# Patient Record
Sex: FEMALE | Race: WHITE | NOT HISPANIC OR LATINO | Employment: UNEMPLOYED | URBAN - METROPOLITAN AREA
[De-identification: names, ages, dates, MRNs, and addresses within clinical notes are randomized per-mention and may not be internally consistent; named-entity substitution may affect disease eponyms.]

---

## 2018-09-02 ENCOUNTER — OFFICE VISIT (OUTPATIENT)
Dept: URGENT CARE | Facility: CLINIC | Age: 53
End: 2018-09-02
Payer: COMMERCIAL

## 2018-09-02 VITALS
OXYGEN SATURATION: 96 % | BODY MASS INDEX: 24.92 KG/M2 | WEIGHT: 132 LBS | DIASTOLIC BLOOD PRESSURE: 67 MMHG | RESPIRATION RATE: 20 BRPM | HEART RATE: 98 BPM | HEIGHT: 61 IN | TEMPERATURE: 100.5 F | SYSTOLIC BLOOD PRESSURE: 124 MMHG

## 2018-09-02 DIAGNOSIS — J06.9 VIRAL UPPER RESPIRATORY TRACT INFECTION: Primary | ICD-10-CM

## 2018-09-02 PROCEDURE — 99203 OFFICE O/P NEW LOW 30 MIN: CPT | Performed by: EMERGENCY MEDICINE

## 2018-09-02 RX ORDER — MELATONIN
1000 DAILY
COMMUNITY

## 2018-09-02 RX ORDER — MORPHINE SULFATE 15 MG/1
15 TABLET ORAL 3 TIMES DAILY
COMMUNITY

## 2018-09-02 RX ORDER — MULTIVIT WITH MINERALS/LUTEIN
1000 TABLET ORAL DAILY
COMMUNITY

## 2018-09-02 RX ORDER — ATORVASTATIN CALCIUM 20 MG/1
20 TABLET, FILM COATED ORAL DAILY
COMMUNITY

## 2018-09-02 RX ORDER — GABAPENTIN 300 MG/1
300 CAPSULE ORAL 2 TIMES DAILY
COMMUNITY

## 2018-09-02 RX ORDER — MULTIVITAMIN
1 CAPSULE ORAL DAILY
COMMUNITY

## 2018-09-02 RX ORDER — HYDROCODONE BITARTRATE AND ACETAMINOPHEN 5; 325 MG/1; MG/1
1 TABLET ORAL EVERY 6 HOURS PRN
COMMUNITY

## 2018-09-02 RX ORDER — ASPIRIN 81 MG/1
81 TABLET ORAL DAILY
COMMUNITY

## 2018-09-02 RX ORDER — PHENOL 1.4 %
600 AEROSOL, SPRAY (ML) MUCOUS MEMBRANE 2 TIMES DAILY WITH MEALS
COMMUNITY

## 2018-09-02 RX ORDER — SERTRALINE HYDROCHLORIDE 100 MG/1
50 TABLET, FILM COATED ORAL DAILY
COMMUNITY

## 2018-09-02 RX ORDER — PREDNISONE 10 MG/1
TABLET ORAL
Qty: 27 TABLET | Refills: 0 | Status: SHIPPED | OUTPATIENT
Start: 2018-09-02

## 2018-09-02 RX ORDER — AMLODIPINE BESYLATE 10 MG/1
10 TABLET ORAL DAILY
COMMUNITY

## 2018-09-02 NOTE — PROGRESS NOTES
[facility]        NAME: Christiano Dodge is a 47 y o  female  : 1965    MRN: [id number]  DATE: 2018  TIME: 2:51 PM    Assessment and Plan   Viral upper respiratory tract infection [J06 9]  1  Viral upper respiratory tract infection  predniSONE 10 mg tablet         Patient Instructions     Patient Instructions     You have been diagnosed with a Viral Upper Respiratory infection and your symptoms should resolve over the next 7 to 10 days with the treatments recommended today  If they do not, it is possible that you have developed a bacterial infection and you should return  If you were to take the antibiotic while you are still in the viral stage, you will not get better any faster, but could kill off the good germs in your body as well as make the germs in you resistant to the antibiotic  Take an expectorant - guaifenesin should be the only ingredient - during the day, and the cough suppressant (ex  Robitussin DM or Tessalon) if needed at night only  Take Zinc 12 5 to 15 mg every 2 - 3 hrs while awake for the next few days  You may take Cold Feroz (13 3 mg of Zinc) or split a 25 mg Zinc tablet or lozenge in two or a 50 mg into four to get the proper dose  The total daily dose of Zinc should exceed 75 mg per day  Upper Respiratory Infection   AMBULATORY CARE:   An upper respiratory infection  is also called a common cold  It can affect your nose, throat, ears, and sinuses  Common signs and symptoms include the following:  Cold symptoms are usually worst for the first 3 to 5 days  You may have any of the following:  · Runny or stuffy nose    · Sneezing and coughing    · Sore throat or hoarseness    · Red, watery, and sore eyes    · Fatigue     · Chills and fever    · Headache, body aches, or sore muscles  Seek care immediately if:   · You have chest pain or trouble breathing  Contact your healthcare provider if:   · You have a fever over 102ºF (39°C)      · Your sore throat gets worse or you see white or yellow spots in your throat  · Your symptoms get worse after 3 to 5 days or your cold is not better in 14 days  · You have a rash anywhere on your skin  · You have large, tender lumps in your neck  · You have thick, green or yellow drainage from your nose  · You cough up thick yellow, green, or bloody mucus  · You have vomiting for more than 24 hours and cannot keep fluids down  · You have a bad earache  · You have questions or concerns about your condition or care  Treatment for a cold: There is no cure for the common cold  Colds are caused by viruses and do not get better with antibiotics  Most people get better in 7 to 14 days  You may continue to cough for 2 to 3 weeks  The following may help decrease your symptoms:  · Decongestants  help reduce nasal congestion and help you breathe more easily  If you take decongestant pills, they may make you feel restless or not able to sleep  Do not use decongestant sprays for more than a few days  · Cough suppressants  help reduce coughing  Ask your healthcare provider which type of cough medicine is best for you  · NSAIDs , such as ibuprofen, help decrease swelling, pain, and fever  NSAIDs can cause stomach bleeding or kidney problems in certain people  If you take blood thinner medicine, always ask your healthcare provider if NSAIDs are safe for you  Always read the medicine label and follow directions  · Acetaminophen  decreases pain and fever  It is available without a doctor's order  Ask how much to take and how often to take it  Follow directions  Read the labels of all other medicines you are using to see if they also contain acetaminophen, or ask your doctor or pharmacist  Acetaminophen can cause liver damage if not taken correctly  Do not use more than 4 grams (4,000 milligrams) total of acetaminophen in one day  Manage your cold:   · Rest as much as possible  Slowly start to do more each day  · Drink more liquids as directed  Liquids will help thin and loosen mucus so you can cough it up  Liquids will also help prevent dehydration  Liquids that help prevent dehydration include water, fruit juice, and broth  Do not drink liquids that contain caffeine  Caffeine can increase your risk for dehydration  Ask your healthcare provider how much liquid to drink each day  · Soothe a sore throat  Gargle with warm salt water  This helps your sore throat feel better  Make salt water by dissolving ¼ teaspoon salt in 1 cup warm water  You may also suck on hard candy or throat lozenges  You may use a sore throat spray  · Use a humidifier or vaporizer  Use a cool mist humidifier or a vaporizer to increase air moisture in your home  This may make it easier for you to breathe and help decrease your cough  · Use saline nasal drops as directed  These help relieve congestion  · Apply petroleum-based jelly around the outside of your nostrils  This can decrease irritation from blowing your nose  · Do not smoke  Nicotine and other chemicals in cigarettes and cigars can make your symptoms worse  They can also cause infections such as bronchitis or pneumonia  Ask your healthcare provider for information if you currently smoke and need help to quit  E-cigarettes or smokeless tobacco still contain nicotine  Talk to your healthcare provider before you use these products  Prevent spreading your cold to others:   · Try to stay away from other people during the first 2 to 3 days of your cold when it is more easily spread  · Do not share food or drinks  · Do not share hand towels with household members  · Wash your hands often, especially after you blow your nose  Turn away from other people and cover your mouth and nose with a tissue when you sneeze or cough  Follow up with your healthcare provider as directed:  Write down your questions so you remember to ask them during your visits     © 2017 [address] [facility] Information is for End User's use only and may not be sold, redistributed or otherwise used for commercial purposes  All illustrations and images included in CareNotes® are the copyrighted property of [employer]  or [employer]  The above information is an  only  It is not intended as medical advice for individual conditions or treatments  Talk to your doctor, nurse or pharmacist before following any medical regimen to see if it is safe and effective for you  Follow up with PCP in 3-5 days  Proceed to  ER if symptoms worsen  Chief Complaint     Chief Complaint   Patient presents with    Cold Like Symptoms     chest and sinus congestion  expectorating thick yellow mucous  started on wednesday         History of Present Illness       Patient with cough and congestion for the past 4 days  Denies fever chills  She is a smoker  She has a nebulizer at home with adequate amount of medication, she also has an albuterol meter dose inhaler with adequate remaining pops  Review of Systems   Review of Systems   Constitutional: Negative for chills and fever  HENT: Positive for congestion, rhinorrhea, sinus pressure and sore throat  Negative for trouble swallowing and voice change  Respiratory: Positive for cough  Negative for chest tightness, shortness of breath and wheezing  Cardiovascular: Negative for chest pain           Current Medications       Current Outpatient Prescriptions:     amLODIPine (NORVASC) 10 mg tablet, Take 10 mg by mouth daily, Disp: , Rfl:     Ascorbic Acid (VITAMIN C) 1000 MG tablet, Take 1,000 mg by mouth daily, Disp: , Rfl:     aspirin (ECOTRIN LOW STRENGTH) 81 mg EC tablet, Take 81 mg by mouth daily, Disp: , Rfl:     atorvastatin (LIPITOR) 20 mg tablet, Take 20 mg by mouth daily, Disp: , Rfl:     calcium carbonate (OS-DOREEN) 600 MG tablet, Take 600 mg by mouth 2 (two) times a day with meals, Disp: , Rfl:    cholecalciferol (VITAMIN D3) 1,000 units tablet, Take 1,000 Units by mouth daily, Disp: , Rfl:     gabapentin (NEURONTIN) 300 mg capsule, Take 300 mg by mouth 2 (two) times a day, Disp: , Rfl:     HYDROcodone-acetaminophen (NORCO) 5-325 mg per tablet, Take 1 tablet by mouth every 6 (six) hours as needed for pain, Disp: , Rfl:     morphine (MSIR) 15 mg tablet, Take 15 mg by mouth 3 (three) times a day, Disp: , Rfl:     Multiple Vitamin (MULTIVITAMIN) capsule, Take 1 capsule by mouth daily, Disp: , Rfl:     naloxegol oxalate (MOVANTIK) 25 MG tablet, Take 25 mg by mouth, Disp: , Rfl:     sertraline (ZOLOFT) 100 mg tablet, Take 50 mg by mouth daily, Disp: , Rfl:     predniSONE 10 mg tablet, Take once daily all days pills on this schedule 6- 6- 5- 4- 3- 2- 1, Disp: 27 tablet, Rfl: 0    Current Allergies     Allergies as of 09/02/2018    (No Known Allergies)            The following portions of the patient's history were reviewed and updated as appropriate: allergies, current medications, past family history, past medical history, past social history, past surgical history and problem list      Past Medical History:   Diagnosis Date    Ankylosis     Anxiety     Depression     DJD (degenerative joint disease)     Gastritis        Past Surgical History:   Procedure Laterality Date    HYSTERECTOMY         No family history on file  Medications have been verified  Objective   /67   Pulse 98   Temp 100 5 °F (38 1 °C) (Tympanic)   Resp 20   Ht 5' 1" (1 549 m)   Wt 59 9 kg (132 lb)   SpO2 96%   BMI 24 94 kg/m²        Physical Exam     Physical Exam   Constitutional: She is oriented to person, place, and time  She appears well-developed and well-nourished  No distress  HENT:   Head: Normocephalic and atraumatic  Right Ear: Tympanic membrane and external ear normal    Left Ear: Tympanic membrane and external ear normal    Nose: Mucosal edema present     Mouth/Throat: Posterior oropharyngeal erythema present  No oropharyngeal exudate or tonsillar abscesses  Neck: Neck supple  Cardiovascular: Normal rate and regular rhythm  Pulmonary/Chest: Effort normal    Abdominal: Soft  Bowel sounds are normal    Neurological: She is alert and oriented to person, place, and time  Skin: Skin is warm and dry  Nursing note and vitals reviewed

## 2018-09-02 NOTE — PATIENT INSTRUCTIONS
You have been diagnosed with a Viral Upper Respiratory infection and your symptoms should resolve over the next 7 to 10 days with the treatments recommended today  If they do not, it is possible that you have developed a bacterial infection and you should return  If you were to take the antibiotic while you are still in the viral stage, you will not get better any faster, but could kill off the good germs in your body as well as make the germs in you resistant to the antibiotic  Take an expectorant - guaifenesin should be the only ingredient - during the day, and the cough suppressant (ex  Robitussin DM or Tessalon) if needed at night only  Take Zinc 12 5 to 15 mg every 2 - 3 hrs while awake for the next few days  You may take Cold Feroz (13 3 mg of Zinc) or split a 25 mg Zinc tablet or lozenge in two or a 50 mg into four to get the proper dose  The total daily dose of Zinc should exceed 75 mg per day  Upper Respiratory Infection   AMBULATORY CARE:   An upper respiratory infection  is also called a common cold  It can affect your nose, throat, ears, and sinuses  Common signs and symptoms include the following:  Cold symptoms are usually worst for the first 3 to 5 days  You may have any of the following:  · Runny or stuffy nose    · Sneezing and coughing    · Sore throat or hoarseness    · Red, watery, and sore eyes    · Fatigue     · Chills and fever    · Headache, body aches, or sore muscles  Seek care immediately if:   · You have chest pain or trouble breathing  Contact your healthcare provider if:   · You have a fever over 102ºF (39°C)  · Your sore throat gets worse or you see white or yellow spots in your throat  · Your symptoms get worse after 3 to 5 days or your cold is not better in 14 days  · You have a rash anywhere on your skin  · You have large, tender lumps in your neck  · You have thick, green or yellow drainage from your nose      · You cough up thick yellow, green, or bloody mucus     · You have vomiting for more than 24 hours and cannot keep fluids down  · You have a bad earache  · You have questions or concerns about your condition or care  Treatment for a cold: There is no cure for the common cold  Colds are caused by viruses and do not get better with antibiotics  Most people get better in 7 to 14 days  You may continue to cough for 2 to 3 weeks  The following may help decrease your symptoms:  · Decongestants  help reduce nasal congestion and help you breathe more easily  If you take decongestant pills, they may make you feel restless or not able to sleep  Do not use decongestant sprays for more than a few days  · Cough suppressants  help reduce coughing  Ask your healthcare provider which type of cough medicine is best for you  · NSAIDs , such as ibuprofen, help decrease swelling, pain, and fever  NSAIDs can cause stomach bleeding or kidney problems in certain people  If you take blood thinner medicine, always ask your healthcare provider if NSAIDs are safe for you  Always read the medicine label and follow directions  · Acetaminophen  decreases pain and fever  It is available without a doctor's order  Ask how much to take and how often to take it  Follow directions  Read the labels of all other medicines you are using to see if they also contain acetaminophen, or ask your doctor or pharmacist  Acetaminophen can cause liver damage if not taken correctly  Do not use more than 4 grams (4,000 milligrams) total of acetaminophen in one day  Manage your cold:   · Rest as much as possible  Slowly start to do more each day  · Drink more liquids as directed  Liquids will help thin and loosen mucus so you can cough it up  Liquids will also help prevent dehydration  Liquids that help prevent dehydration include water, fruit juice, and broth  Do not drink liquids that contain caffeine  Caffeine can increase your risk for dehydration   Ask your healthcare provider how much liquid to drink each day  · Soothe a sore throat  Gargle with warm salt water  This helps your sore throat feel better  Make salt water by dissolving ¼ teaspoon salt in 1 cup warm water  You may also suck on hard candy or throat lozenges  You may use a sore throat spray  · Use a humidifier or vaporizer  Use a cool mist humidifier or a vaporizer to increase air moisture in your home  This may make it easier for you to breathe and help decrease your cough  · Use saline nasal drops as directed  These help relieve congestion  · Apply petroleum-based jelly around the outside of your nostrils  This can decrease irritation from blowing your nose  · Do not smoke  Nicotine and other chemicals in cigarettes and cigars can make your symptoms worse  They can also cause infections such as bronchitis or pneumonia  Ask your healthcare provider for information if you currently smoke and need help to quit  E-cigarettes or smokeless tobacco still contain nicotine  Talk to your healthcare provider before you use these products  Prevent spreading your cold to others:   · Try to stay away from other people during the first 2 to 3 days of your cold when it is more easily spread  · Do not share food or drinks  · Do not share hand towels with household members  · Wash your hands often, especially after you blow your nose  Turn away from other people and cover your mouth and nose with a tissue when you sneeze or cough  Follow up with your healthcare provider as directed:  Write down your questions so you remember to ask them during your visits  © 2017 [address] Information is for End User's use only and may not be sold, redistributed or otherwise used for commercial purposes  All illustrations and images included in CareNotes® are the copyrighted property of [employer]  or [address]  The above information is an  only   It is not intended as medical advice for individual conditions or treatments  Talk to your doctor, nurse or pharmacist before following any medical regimen to see if it is safe and effective for you

## 2023-02-17 ENCOUNTER — APPOINTMENT (OUTPATIENT)
Age: 58
End: 2023-02-17

## 2023-02-17 DIAGNOSIS — M79.672 LEFT FOOT PAIN: ICD-10-CM

## 2023-02-17 DIAGNOSIS — Z87.39 PERSONAL HISTORY OF ARTHRITIS: ICD-10-CM

## 2025-02-04 ENCOUNTER — APPOINTMENT (OUTPATIENT)
Age: 60
End: 2025-02-04
Payer: COMMERCIAL

## 2025-02-04 DIAGNOSIS — E55.9 AVITAMINOSIS D: ICD-10-CM

## 2025-02-04 DIAGNOSIS — M85.89 DISAPPEARING BONE DISEASE: ICD-10-CM

## 2025-02-04 DIAGNOSIS — R73.09 ELEVATED GLUCOSE: ICD-10-CM

## 2025-02-04 LAB
25(OH)D3 SERPL-MCNC: 37.5 NG/ML (ref 30–100)
ALBUMIN SERPL BCG-MCNC: 4.4 G/DL (ref 3.5–5)
ALP SERPL-CCNC: 116 U/L (ref 34–104)
ALT SERPL W P-5'-P-CCNC: 8 U/L (ref 7–52)
ANION GAP SERPL CALCULATED.3IONS-SCNC: 7 MMOL/L (ref 4–13)
AST SERPL W P-5'-P-CCNC: 15 U/L (ref 13–39)
BASOPHILS # BLD AUTO: 0.04 THOUSANDS/ΜL (ref 0–0.1)
BASOPHILS NFR BLD AUTO: 1 % (ref 0–1)
BILIRUB SERPL-MCNC: 0.3 MG/DL (ref 0.2–1)
BUN SERPL-MCNC: 9 MG/DL (ref 5–25)
CALCIUM SERPL-MCNC: 9.9 MG/DL (ref 8.4–10.2)
CHLORIDE SERPL-SCNC: 100 MMOL/L (ref 96–108)
CHOLEST SERPL-MCNC: 206 MG/DL (ref ?–200)
CO2 SERPL-SCNC: 32 MMOL/L (ref 21–32)
CREAT SERPL-MCNC: 0.57 MG/DL (ref 0.6–1.3)
EOSINOPHIL # BLD AUTO: 0.07 THOUSAND/ΜL (ref 0–0.61)
EOSINOPHIL NFR BLD AUTO: 1 % (ref 0–6)
ERYTHROCYTE [DISTWIDTH] IN BLOOD BY AUTOMATED COUNT: 14.3 % (ref 11.6–15.1)
EST. AVERAGE GLUCOSE BLD GHB EST-MCNC: 126 MG/DL
GFR SERPL CREATININE-BSD FRML MDRD: 101 ML/MIN/1.73SQ M
GLUCOSE P FAST SERPL-MCNC: 62 MG/DL (ref 65–99)
HBA1C MFR BLD: 6 %
HCT VFR BLD AUTO: 44 % (ref 34.8–46.1)
HDLC SERPL-MCNC: 66 MG/DL
HGB BLD-MCNC: 13.7 G/DL (ref 11.5–15.4)
IMM GRANULOCYTES # BLD AUTO: 0.02 THOUSAND/UL (ref 0–0.2)
IMM GRANULOCYTES NFR BLD AUTO: 0 % (ref 0–2)
LDLC SERPL CALC-MCNC: 125 MG/DL (ref 0–100)
LYMPHOCYTES # BLD AUTO: 1.49 THOUSANDS/ΜL (ref 0.6–4.47)
LYMPHOCYTES NFR BLD AUTO: 19 % (ref 14–44)
MCH RBC QN AUTO: 27.4 PG (ref 26.8–34.3)
MCHC RBC AUTO-ENTMCNC: 31.1 G/DL (ref 31.4–37.4)
MCV RBC AUTO: 88 FL (ref 82–98)
MONOCYTES # BLD AUTO: 0.64 THOUSAND/ΜL (ref 0.17–1.22)
MONOCYTES NFR BLD AUTO: 8 % (ref 4–12)
NEUTROPHILS # BLD AUTO: 5.52 THOUSANDS/ΜL (ref 1.85–7.62)
NEUTS SEG NFR BLD AUTO: 71 % (ref 43–75)
NONHDLC SERPL-MCNC: 140 MG/DL
NRBC BLD AUTO-RTO: 0 /100 WBCS
PLATELET # BLD AUTO: 421 THOUSANDS/UL (ref 149–390)
PMV BLD AUTO: 8.9 FL (ref 8.9–12.7)
POTASSIUM SERPL-SCNC: 4 MMOL/L (ref 3.5–5.3)
PROT SERPL-MCNC: 7.3 G/DL (ref 6.4–8.4)
RBC # BLD AUTO: 5 MILLION/UL (ref 3.81–5.12)
SODIUM SERPL-SCNC: 139 MMOL/L (ref 135–147)
TRIGL SERPL-MCNC: 77 MG/DL (ref ?–150)
TSH SERPL DL<=0.05 MIU/L-ACNC: 0.97 UIU/ML (ref 0.45–4.5)
WBC # BLD AUTO: 7.78 THOUSAND/UL (ref 4.31–10.16)

## 2025-02-04 PROCEDURE — 80053 COMPREHEN METABOLIC PANEL: CPT

## 2025-02-04 PROCEDURE — 80061 LIPID PANEL: CPT

## 2025-02-04 PROCEDURE — 82306 VITAMIN D 25 HYDROXY: CPT

## 2025-02-04 PROCEDURE — 84443 ASSAY THYROID STIM HORMONE: CPT

## 2025-02-04 PROCEDURE — 36415 COLL VENOUS BLD VENIPUNCTURE: CPT

## 2025-02-04 PROCEDURE — 83036 HEMOGLOBIN GLYCOSYLATED A1C: CPT

## 2025-02-04 PROCEDURE — 85025 COMPLETE CBC W/AUTO DIFF WBC: CPT
